# Patient Record
Sex: FEMALE | URBAN - METROPOLITAN AREA
[De-identification: names, ages, dates, MRNs, and addresses within clinical notes are randomized per-mention and may not be internally consistent; named-entity substitution may affect disease eponyms.]

---

## 2024-07-26 ENCOUNTER — ALLIED HEALTH/NURSE VISIT (OUTPATIENT)
Dept: RESEARCH | Facility: CLINIC | Age: 64
End: 2024-07-26

## 2024-07-26 DIAGNOSIS — Z00.6 RESEARCH STUDY PATIENT: Primary | ICD-10-CM

## 2024-07-26 NOTE — PROGRESS NOTES
Title of Research Study: REACT-AF: The Rhythm Evaluation for AntiCoagulaTion with Continuous Monitoring of Atrial Fibrillation   IRB# [id number]    PI: Deidra Riddle MD,  [phone]  : Sheryl Whitlock  [phone]    Estimated duration of study: Up to 60 months     The patient was approached for possible participation in the above study based on preliminary inclusion and exclusion criteria identified through [facility]. All inclusion and exclusion criteria were reviewed. The current IRB approved consent form was reviewed and discussed at length with the patient over the phone.  This included purpose, research hypothesis, nature of the research, risks & benefits, and procedures required for screening, enrollment/randomization as well as all required follow up.  The control and treatment groups were explained in detail. Discussed confidentiality issues, compensation for injury, and alternative treatments/procedures available. The patient was informed that participation is voluntary and that refusal to participate will involve no penalty or decrease benefits to which they are otherwise entitled. The patient had the opportunity to read the entire written consent, ask questions and concerns of the study staff and offered sufficient time to consider the research trial.  The patient was able to clearly state what study participation involved and the associated requirements. All questions and concerns were addressed.     The patient voluntarily signed the electronic consent/HIPAA form on 1 July 2024 at 1646 prior to any research required tests / procedures and was given a copy of the signed form.